# Patient Record
Sex: MALE | Race: OTHER | NOT HISPANIC OR LATINO | Employment: UNEMPLOYED | URBAN - METROPOLITAN AREA
[De-identification: names, ages, dates, MRNs, and addresses within clinical notes are randomized per-mention and may not be internally consistent; named-entity substitution may affect disease eponyms.]

---

## 2022-03-30 ENCOUNTER — TELEPHONE (OUTPATIENT)
Dept: URGENT CARE | Facility: CLINIC | Age: 57
End: 2022-03-30

## 2022-03-30 ENCOUNTER — APPOINTMENT (OUTPATIENT)
Dept: RADIOLOGY | Facility: CLINIC | Age: 57
End: 2022-03-30
Payer: COMMERCIAL

## 2022-03-30 ENCOUNTER — OFFICE VISIT (OUTPATIENT)
Dept: URGENT CARE | Facility: CLINIC | Age: 57
End: 2022-03-30
Payer: COMMERCIAL

## 2022-03-30 VITALS
TEMPERATURE: 98.1 F | SYSTOLIC BLOOD PRESSURE: 148 MMHG | DIASTOLIC BLOOD PRESSURE: 74 MMHG | RESPIRATION RATE: 18 BRPM | OXYGEN SATURATION: 99 % | HEART RATE: 75 BPM

## 2022-03-30 DIAGNOSIS — M79.641 PAIN OF RIGHT HAND: ICD-10-CM

## 2022-03-30 DIAGNOSIS — L98.9 SKIN LESION OF HAND: Primary | ICD-10-CM

## 2022-03-30 PROCEDURE — 73130 X-RAY EXAM OF HAND: CPT

## 2022-03-30 PROCEDURE — 99214 OFFICE O/P EST MOD 30 MIN: CPT | Performed by: PHYSICIAN ASSISTANT

## 2022-03-30 RX ORDER — MELOXICAM 7.5 MG/1
7.5 TABLET ORAL DAILY
Qty: 10 TABLET | Refills: 0 | Status: SHIPPED | OUTPATIENT
Start: 2022-03-30 | End: 2022-04-09

## 2022-03-30 NOTE — PATIENT INSTRUCTIONS
Mobic as prescribed  Ice 20 minutes 3-4 times per day for 3 days  Insulate the skin from the ice to prevent frostbite  Rest and Elevate  Follow up with hand specialist if symptoms do not resolve  Follow up with PCP in 3-5 days  Go to ED if symptoms worsen

## 2022-03-30 NOTE — PROGRESS NOTES
[id number] [email or web address] [facility]        NAME: John Dawson is a 61 y o  male  : 1965    MRN: [id number]  DATE: 2022  TIME: 9:16 AM    Assessment and Plan   Skin lesion of hand [L98 9]  1  Skin lesion of hand  Ambulatory Referral to Hand Surgery   2  Pain of right hand  XR hand 3+ vw right    meloxicam (Mobic) 7 5 mg tablet     Will cover for gout with NSAID x 10d  Will refer to hand surgery to cover for any tendon abnormality or possible foreign body if sx do not resolve  Recommend patient follow up with PCP in the interim  XR: no acute fracture, dislocation, or foreign body    Patient Instructions     Mobic as prescribed  Ice 20 minutes 3-4 times per day for 3 days  Insulate the skin from the ice to prevent frostbite  Rest and Elevate  Follow up with hand specialist if symptoms do not resolve  Follow up with PCP in 3-5 days  Go to ED if symptoms worsen  Chief Complaint   No chief complaint on file  History of Present Illness       Reports painful lump on surface of his R palm and generalized swelling of R hand x 2 days  Denies injury, breaks to the skin, working with hands, prior similar symptoms, fever, chills, weakness, numbness, decreased ROM  PMH of DM  Review of Systems   Review of Systems   Constitutional: Negative for chills and fever  Musculoskeletal: Positive for joint swelling  Skin: Negative for color change  Neurological: Negative for weakness and numbness           Current Medications       Current Outpatient Medications:     METFORMIN HCL PO, Take by mouth, Disp: , Rfl:     Semaglutide (OZEMPIC, 0 25 OR 0 5 MG/DOSE, SC), Inject under the skin, Disp: , Rfl:     meloxicam (Mobic) 7 5 mg tablet, Take 1 tablet (7 5 mg total) by mouth daily for 10 days, Disp: 10 tablet, Rfl: 0    Current Allergies     Allergies as of 2022    (No Known Allergies)            The following portions of the patient's history were reviewed and updated as appropriate: allergies, current medications, past family history, past medical history, past social history, past surgical history and problem list      Past Medical History:   Diagnosis Date    Diabetes mellitus ([address] )        No past surgical history on file  No family history on file  Medications have been verified  Objective   /74   Pulse 75   Temp 98 1 °F (36 7 °C)   Resp 18   SpO2 99%   No LMP for male patient  Physical Exam     Physical Exam  Vitals reviewed  Constitutional:       General: He is not in acute distress  Appearance: He is well-developed  HENT:      Head: Normocephalic and atraumatic  Cardiovascular:      Rate and Rhythm: Normal rate and regular rhythm  Pulses: Normal pulses  Heart sounds: Normal heart sounds  No murmur heard  No friction rub  No gallop  Pulmonary:      Effort: Pulmonary effort is normal  No respiratory distress  Breath sounds: Normal breath sounds  No wheezing or rales  Chest:      Chest wall: No tenderness  Musculoskeletal:         General: Swelling (mild general swelling compared to L hand) and tenderness present  No deformity  Normal range of motion  Comments: Indurated tender region over distal palmar region between R 3rd and 4th metacarpal    Skin:     General: Skin is warm  Capillary Refill: Capillary refill takes less than 2 seconds  Findings: No bruising or erythema  Neurological:      Mental Status: He is alert and oriented to person, place, and time  Sensory: No sensory deficit  Psychiatric:         Behavior: Behavior normal          Thought Content:  Thought content normal          Judgment: Judgment normal